# Patient Record
Sex: MALE | Race: WHITE | NOT HISPANIC OR LATINO | URBAN - METROPOLITAN AREA
[De-identification: names, ages, dates, MRNs, and addresses within clinical notes are randomized per-mention and may not be internally consistent; named-entity substitution may affect disease eponyms.]

---
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
## 2024-05-23 NOTE — ED ADULT NURSE NOTE - NS ED NURSE LEVEL OF CONSCIOUSNESS MENTAL STATUS
Assumed care of pt at 1125 in . Pt A&Ox4 c/o N/V/D/right flank pain, pt has history of kidney stones, pt denies CP/SOB, pt resting comfortably showing no signs of respiratory distress, pt is calm and cooperative Alert